# Patient Record
(demographics unavailable — no encounter records)

---

## 2017-10-08 NOTE — ER DOCUMENT REPORT
HPI





- HPI


Patient complains to provider of: penile injury


Pain Level: 5


Context: 





mom states they went to the park today and her son started crying. when she got 

him to the car she noticied his penis was outside oh his diaper and he started 

to urinate, she states it was light pink in color. he has peed since without 

evidnece of blood, no clots. he is uncircumcised 





- DERM


Skin Color: Normal





Past Medical History





- Social History


Family History: Reviewed & Not Pertinent


Renal/ Medical History: Denies: Hx Peritoneal Dialysis





Vertical Provider Document





- CONSTITUTIONAL


Agree With Documented VS: Yes


Exam Limitations: No Limitations


General Appearance: WD/WN, No Apparent Distress, Other - playful with mom and 

myself





- INFECTION CONTROL


TRAVEL OUTSIDE OF THE [place] IN LAST 30 DAYS: No





- RESPIRATORY


Respiratory: Breath Sounds Normal, No Respiratory Distress, Chest Non-Tender


O2 Sat by Pulse Oximetry: 98





- CARDIOVASCULAR


Cardiovascular: Regular Rate, Regular Rhythm, No Murmur


Pulses: Normal: Radial





- REPRODUCTIVE


Male Genitalia: Abnormal Inspection - able to retract the foreskin and there is 

evidence of foreskin irritation with minimal bleeding that is not active. 

foreskin able to be replaced, no evidence of balanitis, swelling, candidiasis





- NEURO


Level of Consciousness: Awake, Alert, Appropriate


Motor/Sensory: No Motor Deficit, No Sensory Deficit





- DERM


Integumentary: Warm, Dry, No Rash





Course





- Re-evaluation


Re-evalutation: 





10/08/17 14:00


Patient is a 2 year 5-month-old male who is hemodynamic with stable, no acute 

distress and afebrile.  No evidence of penile contusion, injury, laceration of 

the foreskin.  Discussed with mom basic wound care and to follow-up with 

pediatrician in a week.  Mom agrees with plan.  The patient appears non-toxic 

and well hydrated. There are no signs of life threatening or serious infection 

at this time. The parents / guardian have been instructed to return if the 

child appears to be getting more seriously ill in any way..





- Vital Signs


Vital signs: 


 











Temp Pulse Resp BP Pulse Ox


 


 98.6 F   128   28   109/73   98 


 


 10/08/17 13:12  10/08/17 13:12  10/08/17 13:12  10/08/17 13:12  10/08/17 13:12














Discharge





- Discharge


Clinical Impression: 


 penile injury





Condition: Good


Disposition: HOME, SELF-CARE


Additional Instructions: 


Please care for his foreskin with gentle soap and water during bath time and 

apply small amount of antibiotic ointment to the site. Please return if he is 

not urinating or if you not blood clots in his urine


Referrals: 


ERMA WALTON MD [Primary Care Provider] - Follow up as needed